# Patient Record
(demographics unavailable — no encounter records)

---

## 2025-01-17 NOTE — PHYSICAL EXAM
[Normal Breath Sounds] : Normal breath sounds [2+] : left 2+ [Ankle Swelling (On Exam)] : present [Ankle Swelling Bilaterally] : bilaterally  [Varicose Veins Of Lower Extremities] : bilaterally [Ankle Swelling On The Left] : moderate [] : bilaterally [Ankle Swelling On The Right] : mild [No Rash or Lesion] : No rash or lesion [Alert] : alert [Oriented to Person] : oriented to person [Oriented to Place] : oriented to place [Oriented to Time] : oriented to time [Calm] : calm [JVD] : no jugular venous distention  [de-identified] : well developed female no distress [de-identified] : NCAT [FreeTextEntry1] : indurated tender varices lateral right distal thigh.   mild erythema [de-identified] : full ROM

## 2025-01-17 NOTE — HISTORY OF PRESENT ILLNESS
[FreeTextEntry1] :  Pt is a year-old female here for evaluation of  painful indurated varicose veins of the lateral right leg    Patient has had varicose veins and lymphedema for many years.  Symptoms have progressively worsened over the past several years and include aching, heaviness, swelling  Pt also has long standing lymphedema of both legs, right worse than left.   She has a non-pneumatic compression lymph pump- was using it regularly when developed pain an induration of varices right lateral lower thigh.  Then stopped using it. Tenderness has improved past several weeks.      On exam - varicose veins -lateral distal right thigh, indurated and tender. Mild erythema.    CEAP class   right 3 left  3    Here for evaluation of area of tenderness, discussion regarding use of lymph pump and regarding obtaining velcro wrap ( Circ-aid) compresson garments for legs

## 2025-01-17 NOTE — PROCEDURE
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

## 2025-01-17 NOTE — ASSESSMENT
[FreeTextEntry1] :  Pt is a year-old female here for evaluation of  painful indurated varicose veins of the lateral right leg    Patient has had varicose veins and lymphedema for many years.  Symptoms have progressively worsened over the past several years and include aching, heaviness, swelling  Pt also has long standing lymphedema of both legs, right worse than left.   She has a non-pneumatic compression lymph pump- was using it regularly when developed pain an induration of varices right lateral lower thigh.  Then stopped using it. Tenderness has improved past several weeks.      On exam - varicose veins -lateral distal right thigh, indurated and tender. Mild erythema.    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  Pt also with chronic bilateral lymphedema. Plan - NSAIDs for thrombophlebitis.  Circaid compression (prescribed today) as well as continued use of Koya lymph pump - wait to use on right leg until tenderness/pain resolves in are of superficial thrombophlebitis. [Arterial/Venous Disease] : arterial/venous disease